# Patient Record
Sex: FEMALE | Race: OTHER | Employment: PART TIME | URBAN - METROPOLITAN AREA
[De-identification: names, ages, dates, MRNs, and addresses within clinical notes are randomized per-mention and may not be internally consistent; named-entity substitution may affect disease eponyms.]

---

## 2018-02-02 ENCOUNTER — HOSPITAL ENCOUNTER (EMERGENCY)
Age: 28
Discharge: HOME | End: 2018-02-03
Attending: EMERGENCY MEDICINE
Payer: SUBSIDIZED

## 2018-02-02 ENCOUNTER — APPOINTMENT (OUTPATIENT)
Dept: ULTRASOUND IMAGING | Age: 28
End: 2018-02-02
Attending: PHYSICIAN ASSISTANT
Payer: SUBSIDIZED

## 2018-02-02 DIAGNOSIS — K80.50 BILIARY COLIC: Primary | ICD-10-CM

## 2018-02-02 DIAGNOSIS — K76.0 HEPATIC STEATOSIS: ICD-10-CM

## 2018-02-02 LAB
ALBUMIN SERPL-MCNC: 4.2 G/DL (ref 3.5–5)
ALBUMIN/GLOB SERPL: 1.1 {RATIO} (ref 1.1–2.2)
ALP SERPL-CCNC: 107 U/L (ref 45–117)
ALT SERPL-CCNC: 205 U/L (ref 12–78)
ANION GAP SERPL CALC-SCNC: 7 MMOL/L (ref 5–15)
APPEARANCE UR: ABNORMAL
AST SERPL-CCNC: 266 U/L (ref 15–37)
BACTERIA URNS QL MICRO: NEGATIVE /HPF
BASOPHILS # BLD: 0 K/UL (ref 0–0.1)
BASOPHILS NFR BLD: 0 % (ref 0–1)
BILIRUB SERPL-MCNC: 0.3 MG/DL (ref 0.2–1)
BILIRUB UR QL: NEGATIVE
BUN SERPL-MCNC: 12 MG/DL (ref 6–20)
BUN/CREAT SERPL: 21 (ref 12–20)
CALCIUM SERPL-MCNC: 8.8 MG/DL (ref 8.5–10.1)
CHLORIDE SERPL-SCNC: 105 MMOL/L (ref 97–108)
CO2 SERPL-SCNC: 26 MMOL/L (ref 21–32)
COLOR UR: ABNORMAL
CREAT SERPL-MCNC: 0.56 MG/DL (ref 0.55–1.02)
DIFFERENTIAL METHOD BLD: ABNORMAL
EOSINOPHIL # BLD: 0 K/UL (ref 0–0.4)
EOSINOPHIL NFR BLD: 0 % (ref 0–7)
EPITH CASTS URNS QL MICRO: ABNORMAL /LPF
ERYTHROCYTE [DISTWIDTH] IN BLOOD BY AUTOMATED COUNT: 13.8 % (ref 11.5–14.5)
GLOBULIN SER CALC-MCNC: 4 G/DL (ref 2–4)
GLUCOSE SERPL-MCNC: 103 MG/DL (ref 65–100)
GLUCOSE UR STRIP.AUTO-MCNC: NEGATIVE MG/DL
HCG UR QL: NEGATIVE
HCT VFR BLD AUTO: 40.9 % (ref 35–47)
HGB BLD-MCNC: 13.7 G/DL (ref 11.5–16)
HGB UR QL STRIP: NEGATIVE
HYALINE CASTS URNS QL MICRO: ABNORMAL /LPF (ref 0–5)
IMM GRANULOCYTES # BLD: 0 K/UL (ref 0–0.04)
IMM GRANULOCYTES NFR BLD AUTO: 0 % (ref 0–0.5)
KETONES UR QL STRIP.AUTO: NEGATIVE MG/DL
LEUKOCYTE ESTERASE UR QL STRIP.AUTO: NEGATIVE
LIPASE SERPL-CCNC: 164 U/L (ref 73–393)
LYMPHOCYTES # BLD: 2.4 K/UL (ref 0.8–3.5)
LYMPHOCYTES NFR BLD: 20 % (ref 12–49)
MCH RBC QN AUTO: 29.8 PG (ref 26–34)
MCHC RBC AUTO-ENTMCNC: 33.5 G/DL (ref 30–36.5)
MCV RBC AUTO: 88.9 FL (ref 80–99)
MONOCYTES # BLD: 0.6 K/UL (ref 0–1)
MONOCYTES NFR BLD: 5 % (ref 5–13)
NEUTS SEG # BLD: 8.9 K/UL (ref 1.8–8)
NEUTS SEG NFR BLD: 75 % (ref 32–75)
NITRITE UR QL STRIP.AUTO: NEGATIVE
NRBC # BLD: 0 K/UL (ref 0–0.01)
NRBC BLD-RTO: 0 PER 100 WBC
PH UR STRIP: 7.5 [PH] (ref 5–8)
PLATELET # BLD AUTO: 400 K/UL (ref 150–400)
PMV BLD AUTO: 10.2 FL (ref 8.9–12.9)
POTASSIUM SERPL-SCNC: 3.4 MMOL/L (ref 3.5–5.1)
PROT SERPL-MCNC: 8.2 G/DL (ref 6.4–8.2)
PROT UR STRIP-MCNC: NEGATIVE MG/DL
RBC # BLD AUTO: 4.6 M/UL (ref 3.8–5.2)
RBC #/AREA URNS HPF: ABNORMAL /HPF (ref 0–5)
SODIUM SERPL-SCNC: 138 MMOL/L (ref 136–145)
SP GR UR REFRACTOMETRY: 1.02 (ref 1–1.03)
UROBILINOGEN UR QL STRIP.AUTO: 0.2 EU/DL (ref 0.2–1)
WBC # BLD AUTO: 12 K/UL (ref 3.6–11)
WBC URNS QL MICRO: ABNORMAL /HPF (ref 0–4)

## 2018-02-02 PROCEDURE — 83690 ASSAY OF LIPASE: CPT | Performed by: PHYSICIAN ASSISTANT

## 2018-02-02 PROCEDURE — 81003 URINALYSIS AUTO W/O SCOPE: CPT | Performed by: PHYSICIAN ASSISTANT

## 2018-02-02 PROCEDURE — 99283 EMERGENCY DEPT VISIT LOW MDM: CPT

## 2018-02-02 PROCEDURE — 85025 COMPLETE CBC W/AUTO DIFF WBC: CPT | Performed by: STUDENT IN AN ORGANIZED HEALTH CARE EDUCATION/TRAINING PROGRAM

## 2018-02-02 PROCEDURE — 76705 ECHO EXAM OF ABDOMEN: CPT

## 2018-02-02 PROCEDURE — 81025 URINE PREGNANCY TEST: CPT

## 2018-02-02 PROCEDURE — 36415 COLL VENOUS BLD VENIPUNCTURE: CPT | Performed by: STUDENT IN AN ORGANIZED HEALTH CARE EDUCATION/TRAINING PROGRAM

## 2018-02-02 PROCEDURE — 80053 COMPREHEN METABOLIC PANEL: CPT | Performed by: STUDENT IN AN ORGANIZED HEALTH CARE EDUCATION/TRAINING PROGRAM

## 2018-02-03 VITALS
WEIGHT: 158 LBS | OXYGEN SATURATION: 98 % | SYSTOLIC BLOOD PRESSURE: 109 MMHG | TEMPERATURE: 99 F | HEART RATE: 71 BPM | RESPIRATION RATE: 16 BRPM | HEIGHT: 59 IN | BODY MASS INDEX: 31.85 KG/M2 | DIASTOLIC BLOOD PRESSURE: 69 MMHG

## 2018-02-03 NOTE — ED PROVIDER NOTES
HPI Comments: 34 yo  female with known hx of gallstones presenting ambulatory to the Ed with complaint of a several year hx of post-prandial RUQ abdominal pain, acutely worse today about 1800 after eating a [place] sandwich, with associated nausea without vomiting. Pain improved since arriving to the ED. No associated fever, chills, headache, chest pain, SOB, vomiting, diarrhea, constipation or urinary complaint. No medications PTA. Patient is a 33 y.o. female presenting with abdominal pain. The history is provided by the patient. Abdominal Pain    Associated symptoms include nausea. Pertinent negatives include no fever, no diarrhea, no vomiting, no constipation, no frequency, no headaches, no arthralgias, no chest pain and no back pain. Past Medical History:   Diagnosis Date    Gall stone        History reviewed. No pertinent surgical history. History reviewed. No pertinent family history. Social History     Social History    Marital status: SINGLE     Spouse name: N/A    Number of children: N/A    Years of education: N/A     Occupational History    Not on file. Social History Main Topics    Smoking status: Never Smoker    Smokeless tobacco: Never Used    Alcohol use No    Drug use: Not on file    Sexual activity: Not on file     Other Topics Concern    Not on file     Social History Narrative    No narrative on file         ALLERGIES: Review of patient's allergies indicates no known allergies. Review of Systems   Constitutional: Negative. Negative for chills, fatigue and fever. HENT: Negative. Negative for congestion, ear pain, facial swelling, rhinorrhea, sneezing and sore throat. Eyes: Negative for pain, discharge and itching. Respiratory: Negative for cough, chest tightness and shortness of breath. Cardiovascular: Negative. Negative for chest pain and leg swelling. Gastrointestinal: Positive for abdominal pain (ruq) and nausea.  Negative for abdominal distention, constipation, diarrhea and vomiting. Genitourinary: Negative for difficulty urinating, frequency and urgency. Musculoskeletal: Negative for arthralgias, back pain, joint swelling, neck pain and neck stiffness. Skin: Negative for color change and rash. Neurological: Negative for dizziness, numbness and headaches. Psychiatric/Behavioral: Negative for confusion and decreased concentration. All other systems reviewed and are negative. Vitals:    02/02/18 2027   BP: 123/78   Pulse: 77   Resp: 16   Temp: 98.5 °F (36.9 °C)   SpO2: 98%   Weight: 71.7 kg (158 lb)   Height: 4' 11\" (1.499 m)            Physical Exam   Constitutional: She is oriented to person, place, and time. She appears well-developed and well-nourished. No distress. Well appearing  female in NAD   HENT:   Head: Normocephalic and atraumatic. Right Ear: External ear normal.   Left Ear: External ear normal.   Nose: Nose normal.   Mouth/Throat: Oropharynx is clear and moist. No oropharyngeal exudate. Eyes: Conjunctivae and EOM are normal. Pupils are equal, round, and reactive to light. Right eye exhibits no discharge. Left eye exhibits no discharge. No scleral icterus. Neck: Normal range of motion. Neck supple. Cardiovascular: Normal rate and regular rhythm. Exam reveals no gallop and no friction rub. No murmur heard. Pulmonary/Chest: Effort normal and breath sounds normal. She has no wheezes. She has no rales. Abdominal: Soft. Bowel sounds are normal. She exhibits no distension. There is tenderness (Ruq). There is no rebound and no guarding. Neurological: She is alert and oriented to person, place, and time. No cranial nerve deficit. Coordination normal.   Skin: Skin is warm and dry. She is not diaphoretic. Psychiatric: She has a normal mood and affect. Her behavior is normal.   Nursing note and vitals reviewed.        MDM  Number of Diagnoses or Management Options  Biliary colic:   Hepatic steatosis:   Diagnosis management comments: 34 yo  female with complaint acute on chronic prandially associated RUQ abdominal pain. Suspect biliary colic vs gastritis vs PUD vs pancreatitis amongst others    Plan  CBC  CMP  Lipase  US abd       Amount and/or Complexity of Data Reviewed  Clinical lab tests: ordered and reviewed  Tests in the radiology section of CPT®: ordered and reviewed  Independent visualization of images, tracings, or specimens: yes          ED Course       Procedures    Progress note    Gallstones noted with transaminitis. Normal Bili and lipase. Pain free without use of analgesia in ED.  nml CBD size. No indication of choledocholithiasis. Doubt acute cholecystitis only positive Sharma without associated pericholecystic fluid or gallbladder wall thickening. Hepatic steatosis noted. Demetrio Garcia    Patient's results have been reviewed with them. Patient and/or family have verbally conveyed their understanding and agreement of the patient's signs, symptoms, diagnosis, treatment and prognosis and additionally agree to follow up as recommended or return to the Emergency Room should their condition change prior to follow-up. Discharge instructions have also been provided to the patient with some educational information regarding their diagnosis as well a list of reasons why they would want to return to the ER prior to their follow-up appointment should their condition change. Demetrio Gacria    A/p  Biliary colic: Low fat diet. Follow-up with general surgery. Return for any new or worsening.  Demetrio Dailey

## 2018-02-03 NOTE — ED NOTES
Discharge instructions given to pt in Croatian. All questions answered and pt verbalized understanding. V/S stable @ time of discharge. No lines or drains in place. Pt ambulatory out of unit.

## 2018-02-03 NOTE — DISCHARGE INSTRUCTIONS
Dieta baja en grasa para la enfermedad de la vesícula biliar: Instrucciones de cuidado - [ Low-Fat Diet for Gallbladder Disease: Care Instructions ]  Instrucciones de cuidado    Cuando usted come, la vesícula biliar secreta bilis, que ayuda a digerir la [employer]. Si la vesícula biliar está inflamada podría causar dolor. Alberteen Mean baja en grasa podría darle a fung vesícula biliar un descanso para que usted pueda empezar a sanar. Fung médico y fung dietista pueden ayudarle a hacer un plan de alimentación que no le irrite el sistema digestivo. Siempre consulte a fung médico o dietista antes de hacer modificaciones en fung dieta. La atención de seguimiento es olivia parte clave de fung tratamiento y seguridad. Asegúrese de hacer y acudir a todas las citas, y llame a fung médico si está teniendo problemas. También es olivia buena idea saber los resultados de los exámenes y mantener olivia lista de los medicamentos que marcia. ¿Cómo puede cuidarse en el hogar? · Coma varias comidas pequeñas y refrigerios cada día, en lugar de tiffany comidas grandes. · Opte por charla magras. ¨ No coma más de 5 a 6½ onzas de carne al día. ¨ Descarte toda la grasa que pueda wes. ¨ Coma valarie y pavo sin piel. ¨ Muchos tipos de pescado, aroldo el salmón, la trucha de [place], el atún y el arenque proveen grasa omega-3 saludable. Lillie evite el pescado enlatado en aceite, aroldo las ramiro en aceite de [place]. [address] pescado al horno, a la ramiro o asados en lugar de freírlos con [place] o grasa. · [address] y coma yogur, queso u otros derivados lácteos sin grasa o bajos en grasa todos los [address] .  Ana las Quik.iorhina Martínez, y [employer] que tengan menos de 5 gramos de grasa por onza. ¨ Pruebe la crema agria, el queso para untar o el yogur descremados. ¨ Evite las sopas tipo crema y las salsas cremosas para la pasta. ¨ [place] helados, helados de yogur o sorbetes bajos en grasa. Evite el helado común. · Consuma cereales, pan, galletas, arroz o pasta de granos enteros. Evite los alimentos altos en grasa aroldo medialunas (\"croissants\"), bollos, bizcochos, gofres (\"waffles\"), rosquillas (\"donuts\"), magdalenas (\"muffins\"), granola y pan rico en grasa. · Condimente los alimentos con hierbas y especias (tales aroldo [place], estragón o menta), salsas sin grasa o jugo de bolivar en lugar de [place]. También puede usar sustitutos de la [place], [place] sin grasa y [facility] sin [place]. · Pruebe a hacer puré de [place], puré de ciruelas pasas o puré de bananas (plátanos) para reemplazar alguna o toda la grasa cuando polo comidas al horno. · Limite el uso de [place] y [place], tales aroldo [place], [facility], [place] y [facility] para [place], a no más de 1 cucharada por comida. · Evite los alimentos ricos en grasa, aroldo:  ¨ El chocolate, la [place], el helado y el queso procesado. ¨ Alimentos fritos o con [place]. ¨ Salchicha, salami y tocino. ¨ Rollos de christopher, pasteles, tartas, galletas y otro tipo de pastelería. ¨ Los refrigerios preparados, aroldo javan fritas, barritas de nueces y granola, así aroldo nueces mixtas. ¨ Gabby y aguacate (palta). · Aprenda a leer las etiquetas de los alimentos para conocer el tamaño de las porciones y los ingredientes. En general, las comidas rápidas y las comidas preparadas contienen Krista Colton. ¿Dónde puede encontrar más información en inglés? Aund[place] Dates a http://arlen-riri.info/. Marilyn Pacheco X504 en la búsqueda para aprender más acerca de \"Dieta baja en grasa para la enfermedad de la vesícula biliar: Instrucciones de cuidado - [ Low-Fat Diet for Gallbladder Disease: Care Instructions ]. \"  Revisado: 12 villela, 2017  Versión del contenido: 11.4  © 1529-2433 Healthwise, [employer]. Las instrucciones de cuidado fueron adaptadas bajo licencia por [employer] (which disclaims liability or warranty for this information).  Si usted tiene [place] afección médica o sobre estas instrucciones, siempre pregunte a fung profesional de alli. [facility] niega toda garantía o responsabilidad por fung uso de esta información.

## 2018-02-03 NOTE — ED TRIAGE NOTES
Pt is a Mauritian speaking pt, [email or web address] phone in use for triage. Pt states that she has had strong stomach pain for the past three hours, pt states that her upper right quadrant is where the pain is. Pt states she has nausea but no vomiting or diarrhea.

## 2018-02-03 NOTE — ED NOTES
Bedside shift change report given to Jose Torres RN (oncoming nurse) by Stacie Turcios RN (offgoing nurse). Report included the following information SBAR, ED Summary, Intake/Output, MAR and Recent Results.

## 2018-05-16 ENCOUNTER — HOSPITAL ENCOUNTER (OUTPATIENT)
Dept: ULTRASOUND IMAGING | Age: 28
Discharge: HOME | End: 2018-05-16
Attending: FAMILY MEDICINE
Payer: SUBSIDIZED

## 2018-05-16 DIAGNOSIS — R10.11 RUQ ABDOMINAL PAIN: ICD-10-CM

## 2018-05-16 PROCEDURE — 76801 OB US < 14 WKS SINGLE FETUS: CPT

## 2018-05-16 PROCEDURE — 76700 US EXAM ABDOM COMPLETE: CPT

## 2018-05-16 PROCEDURE — 76817 TRANSVAGINAL US OBSTETRIC: CPT

## 2019-05-28 ENCOUNTER — ANESTHESIA (OUTPATIENT)
Dept: SURGERY | Age: 29
End: 2019-05-28
Payer: SUBSIDIZED

## 2019-05-28 ENCOUNTER — HOSPITAL ENCOUNTER (OUTPATIENT)
Age: 29
Setting detail: OBSERVATION
Discharge: HOME | End: 2019-05-28
Attending: EMERGENCY MEDICINE | Admitting: SURGERY
Payer: SUBSIDIZED

## 2019-05-28 ENCOUNTER — APPOINTMENT (OUTPATIENT)
Dept: ULTRASOUND IMAGING | Age: 29
End: 2019-05-28
Attending: EMERGENCY MEDICINE
Payer: SUBSIDIZED

## 2019-05-28 ENCOUNTER — ANESTHESIA EVENT (OUTPATIENT)
Dept: SURGERY | Age: 29
End: 2019-05-28
Payer: SUBSIDIZED

## 2019-05-28 VITALS
HEIGHT: 59 IN | WEIGHT: 158 LBS | HEART RATE: 87 BPM | RESPIRATION RATE: 19 BRPM | BODY MASS INDEX: 31.85 KG/M2 | DIASTOLIC BLOOD PRESSURE: 66 MMHG | OXYGEN SATURATION: 97 % | SYSTOLIC BLOOD PRESSURE: 114 MMHG | TEMPERATURE: 97.6 F

## 2019-05-28 DIAGNOSIS — K80.70 CHOLELITHIASIS WITH CHOLEDOCHOLITHIASIS: Primary | ICD-10-CM

## 2019-05-28 PROBLEM — K81.9 CHOLECYSTITIS: Status: ACTIVE | Noted: 2019-05-28

## 2019-05-28 LAB
ALBUMIN SERPL-MCNC: 4 G/DL (ref 3.5–5)
ALBUMIN/GLOB SERPL: 1.1 {RATIO} (ref 1.1–2.2)
ALP SERPL-CCNC: 108 U/L (ref 45–117)
ALT SERPL-CCNC: 60 U/L (ref 12–78)
ANION GAP SERPL CALC-SCNC: 8 MMOL/L (ref 5–15)
AST SERPL-CCNC: 41 U/L (ref 15–37)
BASOPHILS # BLD: 0 K/UL (ref 0–0.1)
BASOPHILS NFR BLD: 1 % (ref 0–1)
BILIRUB SERPL-MCNC: 0.5 MG/DL (ref 0.2–1)
BUN SERPL-MCNC: 15 MG/DL (ref 6–20)
BUN/CREAT SERPL: 29 (ref 12–20)
CALCIUM SERPL-MCNC: 8.6 MG/DL (ref 8.5–10.1)
CHLORIDE SERPL-SCNC: 109 MMOL/L (ref 97–108)
CO2 SERPL-SCNC: 21 MMOL/L (ref 21–32)
CREAT SERPL-MCNC: 0.51 MG/DL (ref 0.55–1.02)
DIFFERENTIAL METHOD BLD: ABNORMAL
EOSINOPHIL # BLD: 0 K/UL (ref 0–0.4)
EOSINOPHIL NFR BLD: 1 % (ref 0–7)
ERYTHROCYTE [DISTWIDTH] IN BLOOD BY AUTOMATED COUNT: 13.4 % (ref 11.5–14.5)
GLOBULIN SER CALC-MCNC: 3.6 G/DL (ref 2–4)
GLUCOSE SERPL-MCNC: 108 MG/DL (ref 65–100)
HCG UR QL: NEGATIVE
HCT VFR BLD AUTO: 40.7 % (ref 35–47)
HGB BLD-MCNC: 13.6 G/DL (ref 11.5–16)
IMM GRANULOCYTES # BLD AUTO: 0 K/UL (ref 0–0.04)
IMM GRANULOCYTES NFR BLD AUTO: 0 % (ref 0–0.5)
LIPASE SERPL-CCNC: 101 U/L (ref 73–393)
LYMPHOCYTES # BLD: 1.4 K/UL (ref 0.8–3.5)
LYMPHOCYTES NFR BLD: 16 % (ref 12–49)
MCH RBC QN AUTO: 30.2 PG (ref 26–34)
MCHC RBC AUTO-ENTMCNC: 33.4 G/DL (ref 30–36.5)
MCV RBC AUTO: 90.4 FL (ref 80–99)
MONOCYTES # BLD: 0.5 K/UL (ref 0–1)
MONOCYTES NFR BLD: 6 % (ref 5–13)
NEUTS SEG # BLD: 6.5 K/UL (ref 1.8–8)
NEUTS SEG NFR BLD: 76 % (ref 32–75)
NRBC # BLD: 0 K/UL (ref 0–0.01)
NRBC BLD-RTO: 0 PER 100 WBC
PLATELET # BLD AUTO: 361 K/UL (ref 150–400)
PMV BLD AUTO: 9.7 FL (ref 8.9–12.9)
POTASSIUM SERPL-SCNC: 3.8 MMOL/L (ref 3.5–5.1)
PROT SERPL-MCNC: 7.6 G/DL (ref 6.4–8.2)
RBC # BLD AUTO: 4.5 M/UL (ref 3.8–5.2)
SODIUM SERPL-SCNC: 138 MMOL/L (ref 136–145)
WBC # BLD AUTO: 8.4 K/UL (ref 3.6–11)

## 2019-05-28 PROCEDURE — 74011250636 HC RX REV CODE- 250/636

## 2019-05-28 PROCEDURE — 74011250636 HC RX REV CODE- 250/636: Performed by: EMERGENCY MEDICINE

## 2019-05-28 PROCEDURE — 77030031139 HC SUT VCRL2 J&J -A: Performed by: SURGERY

## 2019-05-28 PROCEDURE — 77030020782 HC GWN BAIR PAWS FLX 3M -B

## 2019-05-28 PROCEDURE — 74011636320 HC RX REV CODE- 636/320: Performed by: SURGERY

## 2019-05-28 PROCEDURE — 80053 COMPREHEN METABOLIC PANEL: CPT

## 2019-05-28 PROCEDURE — 77030037032 HC INSRT SCIS CLICKLLINE DISP STOR -B: Performed by: SURGERY

## 2019-05-28 PROCEDURE — 76705 ECHO EXAM OF ABDOMEN: CPT

## 2019-05-28 PROCEDURE — 74011000250 HC RX REV CODE- 250

## 2019-05-28 PROCEDURE — 77030004813 HC CATH CHOLGM TELE -B: Performed by: SURGERY

## 2019-05-28 PROCEDURE — 85025 COMPLETE CBC W/AUTO DIFF WBC: CPT

## 2019-05-28 PROCEDURE — 99285 EMERGENCY DEPT VISIT HI MDM: CPT

## 2019-05-28 PROCEDURE — 77030011640 HC PAD GRND REM COVD -A: Performed by: SURGERY

## 2019-05-28 PROCEDURE — 96361 HYDRATE IV INFUSION ADD-ON: CPT

## 2019-05-28 PROCEDURE — 74011250636 HC RX REV CODE- 250/636: Performed by: SURGERY

## 2019-05-28 PROCEDURE — 74011000250 HC RX REV CODE- 250: Performed by: SURGERY

## 2019-05-28 PROCEDURE — 77030032490 HC SLV COMPR SCD KNE COVD -B

## 2019-05-28 PROCEDURE — 36415 COLL VENOUS BLD VENIPUNCTURE: CPT

## 2019-05-28 PROCEDURE — 76210000016 HC OR PH I REC 1 TO 1.5 HR: Performed by: SURGERY

## 2019-05-28 PROCEDURE — 77030025088 HC APPL CLP LIG 1 COVD -B: Performed by: SURGERY

## 2019-05-28 PROCEDURE — 77030020747 HC TU INSUF ENDOSC TELE -A: Performed by: SURGERY

## 2019-05-28 PROCEDURE — 99218 HC RM OBSERVATION: CPT

## 2019-05-28 PROCEDURE — 77030008756 HC TU IRR SUC STRY -B: Performed by: SURGERY

## 2019-05-28 PROCEDURE — 77030035051: Performed by: SURGERY

## 2019-05-28 PROCEDURE — 77030002933 HC SUT MCRYL J&J -A: Performed by: SURGERY

## 2019-05-28 PROCEDURE — 96360 HYDRATION IV INFUSION INIT: CPT

## 2019-05-28 PROCEDURE — 77030026438 HC STYL ET INTUB CARD -A: Performed by: ANESTHESIOLOGY

## 2019-05-28 PROCEDURE — 77030009852 HC PCH RTVR ENDOSC COVD -B: Performed by: SURGERY

## 2019-05-28 PROCEDURE — 77030035048 HC TRCR ENDOSC OPTCL COVD -B: Performed by: SURGERY

## 2019-05-28 PROCEDURE — C9290 INJ, BUPIVACAINE LIPOSOME: HCPCS | Performed by: SURGERY

## 2019-05-28 PROCEDURE — 83690 ASSAY OF LIPASE: CPT

## 2019-05-28 PROCEDURE — 77030008684 HC TU ET CUF COVD -B: Performed by: ANESTHESIOLOGY

## 2019-05-28 PROCEDURE — 74011250636 HC RX REV CODE- 250/636: Performed by: ANESTHESIOLOGY

## 2019-05-28 PROCEDURE — 76010000149 HC OR TIME 1 TO 1.5 HR: Performed by: SURGERY

## 2019-05-28 PROCEDURE — 81025 URINE PREGNANCY TEST: CPT

## 2019-05-28 PROCEDURE — 77030035045 HC TRCR ENDOSC VRSPRT BLDLSS COVD -B: Performed by: SURGERY

## 2019-05-28 PROCEDURE — 88304 TISSUE EXAM BY PATHOLOGIST: CPT

## 2019-05-28 PROCEDURE — 76060000033 HC ANESTHESIA 1 TO 1.5 HR: Performed by: SURGERY

## 2019-05-28 RX ORDER — KETOROLAC TROMETHAMINE 30 MG/ML
INJECTION, SOLUTION INTRAMUSCULAR; INTRAVENOUS AS NEEDED
Status: DISCONTINUED | OUTPATIENT
Start: 2019-05-28 | End: 2019-05-28 | Stop reason: HOSPADM

## 2019-05-28 RX ORDER — HYDROMORPHONE HYDROCHLORIDE 2 MG/ML
1 INJECTION, SOLUTION INTRAMUSCULAR; INTRAVENOUS; SUBCUTANEOUS
Status: DISCONTINUED | OUTPATIENT
Start: 2019-05-28 | End: 2019-05-28

## 2019-05-28 RX ORDER — DIPHENHYDRAMINE HYDROCHLORIDE 50 MG/ML
12.5 INJECTION, SOLUTION INTRAMUSCULAR; INTRAVENOUS AS NEEDED
Status: DISCONTINUED | OUTPATIENT
Start: 2019-05-28 | End: 2019-05-28 | Stop reason: HOSPADM

## 2019-05-28 RX ORDER — SUCCINYLCHOLINE CHLORIDE 20 MG/ML
INJECTION INTRAMUSCULAR; INTRAVENOUS AS NEEDED
Status: DISCONTINUED | OUTPATIENT
Start: 2019-05-28 | End: 2019-05-28 | Stop reason: HOSPADM

## 2019-05-28 RX ORDER — DEXAMETHASONE SODIUM PHOSPHATE 4 MG/ML
INJECTION, SOLUTION INTRA-ARTICULAR; INTRALESIONAL; INTRAMUSCULAR; INTRAVENOUS; SOFT TISSUE AS NEEDED
Status: DISCONTINUED | OUTPATIENT
Start: 2019-05-28 | End: 2019-05-28 | Stop reason: HOSPADM

## 2019-05-28 RX ORDER — DIPHENHYDRAMINE HCL 25 MG
25 CAPSULE ORAL
Status: DISCONTINUED | OUTPATIENT
Start: 2019-05-28 | End: 2019-05-28 | Stop reason: HOSPADM

## 2019-05-28 RX ORDER — MIDAZOLAM HYDROCHLORIDE 1 MG/ML
2 INJECTION, SOLUTION INTRAMUSCULAR; INTRAVENOUS
Status: DISCONTINUED | OUTPATIENT
Start: 2019-05-28 | End: 2019-05-28 | Stop reason: HOSPADM

## 2019-05-28 RX ORDER — POLYETHYLENE GLYCOL 3350 17 G/17G
17 POWDER, FOR SOLUTION ORAL DAILY
Qty: 238 G | Refills: 0 | Status: SHIPPED | OUTPATIENT
Start: 2019-05-28

## 2019-05-28 RX ORDER — FENTANYL CITRATE 50 UG/ML
INJECTION, SOLUTION INTRAMUSCULAR; INTRAVENOUS AS NEEDED
Status: DISCONTINUED | OUTPATIENT
Start: 2019-05-28 | End: 2019-05-28 | Stop reason: HOSPADM

## 2019-05-28 RX ORDER — SODIUM CHLORIDE, SODIUM LACTATE, POTASSIUM CHLORIDE, CALCIUM CHLORIDE 600; 310; 30; 20 MG/100ML; MG/100ML; MG/100ML; MG/100ML
75 INJECTION, SOLUTION INTRAVENOUS CONTINUOUS
Status: DISCONTINUED | OUTPATIENT
Start: 2019-05-28 | End: 2019-05-28 | Stop reason: HOSPADM

## 2019-05-28 RX ORDER — SODIUM CHLORIDE, SODIUM LACTATE, POTASSIUM CHLORIDE, CALCIUM CHLORIDE 600; 310; 30; 20 MG/100ML; MG/100ML; MG/100ML; MG/100ML
125 INJECTION, SOLUTION INTRAVENOUS CONTINUOUS
Status: DISCONTINUED | OUTPATIENT
Start: 2019-05-28 | End: 2019-05-28 | Stop reason: HOSPADM

## 2019-05-28 RX ORDER — ONDANSETRON 4 MG/1
4 TABLET, ORALLY DISINTEGRATING ORAL
Qty: 20 TAB | Refills: 1 | Status: SHIPPED | OUTPATIENT
Start: 2019-05-28

## 2019-05-28 RX ORDER — NALOXONE HYDROCHLORIDE 0.4 MG/ML
0.2 INJECTION, SOLUTION INTRAMUSCULAR; INTRAVENOUS; SUBCUTANEOUS
Status: DISCONTINUED | OUTPATIENT
Start: 2019-05-28 | End: 2019-05-28 | Stop reason: HOSPADM

## 2019-05-28 RX ORDER — ONDANSETRON 2 MG/ML
4 INJECTION INTRAMUSCULAR; INTRAVENOUS
Status: DISCONTINUED | OUTPATIENT
Start: 2019-05-28 | End: 2019-05-28 | Stop reason: HOSPADM

## 2019-05-28 RX ORDER — NEOSTIGMINE METHYLSULFATE 1 MG/ML
INJECTION INTRAVENOUS AS NEEDED
Status: DISCONTINUED | OUTPATIENT
Start: 2019-05-28 | End: 2019-05-28 | Stop reason: HOSPADM

## 2019-05-28 RX ORDER — LIDOCAINE HYDROCHLORIDE 20 MG/ML
INJECTION, SOLUTION EPIDURAL; INFILTRATION; INTRACAUDAL; PERINEURAL AS NEEDED
Status: DISCONTINUED | OUTPATIENT
Start: 2019-05-28 | End: 2019-05-28 | Stop reason: HOSPADM

## 2019-05-28 RX ORDER — OXYCODONE AND ACETAMINOPHEN 5; 325 MG/1; MG/1
2 TABLET ORAL
Status: DISCONTINUED | OUTPATIENT
Start: 2019-05-28 | End: 2019-05-28 | Stop reason: HOSPADM

## 2019-05-28 RX ORDER — ROCURONIUM BROMIDE 10 MG/ML
INJECTION, SOLUTION INTRAVENOUS AS NEEDED
Status: DISCONTINUED | OUTPATIENT
Start: 2019-05-28 | End: 2019-05-28 | Stop reason: HOSPADM

## 2019-05-28 RX ORDER — HYDROMORPHONE HYDROCHLORIDE 1 MG/ML
.25-1 INJECTION, SOLUTION INTRAMUSCULAR; INTRAVENOUS; SUBCUTANEOUS
Status: DISCONTINUED | OUTPATIENT
Start: 2019-05-28 | End: 2019-05-28 | Stop reason: HOSPADM

## 2019-05-28 RX ORDER — ONDANSETRON 2 MG/ML
4 INJECTION INTRAMUSCULAR; INTRAVENOUS
Status: DISCONTINUED | OUTPATIENT
Start: 2019-05-28 | End: 2019-05-28

## 2019-05-28 RX ORDER — KETOROLAC TROMETHAMINE 30 MG/ML
30 INJECTION, SOLUTION INTRAMUSCULAR; INTRAVENOUS EVERY 6 HOURS
Status: DISCONTINUED | OUTPATIENT
Start: 2019-05-28 | End: 2019-05-28 | Stop reason: HOSPADM

## 2019-05-28 RX ORDER — CEFAZOLIN SODIUM/WATER 2 G/20 ML
2 SYRINGE (ML) INTRAVENOUS ONCE
Status: COMPLETED | OUTPATIENT
Start: 2019-05-28 | End: 2019-05-28

## 2019-05-28 RX ORDER — OXYCODONE AND ACETAMINOPHEN 5; 325 MG/1; MG/1
1 TABLET ORAL
Qty: 30 TAB | Refills: 0 | Status: SHIPPED | OUTPATIENT
Start: 2019-05-28 | End: 2019-06-04

## 2019-05-28 RX ORDER — FLUMAZENIL 0.1 MG/ML
0.2 INJECTION INTRAVENOUS
Status: DISCONTINUED | OUTPATIENT
Start: 2019-05-28 | End: 2019-05-28 | Stop reason: HOSPADM

## 2019-05-28 RX ORDER — LIDOCAINE HYDROCHLORIDE 10 MG/ML
0.1 INJECTION, SOLUTION EPIDURAL; INFILTRATION; INTRACAUDAL; PERINEURAL AS NEEDED
Status: DISCONTINUED | OUTPATIENT
Start: 2019-05-28 | End: 2019-05-28 | Stop reason: HOSPADM

## 2019-05-28 RX ORDER — OXYCODONE AND ACETAMINOPHEN 5; 325 MG/1; MG/1
1 TABLET ORAL
Status: DISCONTINUED | OUTPATIENT
Start: 2019-05-28 | End: 2019-05-28 | Stop reason: HOSPADM

## 2019-05-28 RX ORDER — ONDANSETRON 2 MG/ML
INJECTION INTRAMUSCULAR; INTRAVENOUS AS NEEDED
Status: DISCONTINUED | OUTPATIENT
Start: 2019-05-28 | End: 2019-05-28 | Stop reason: HOSPADM

## 2019-05-28 RX ORDER — GLYCOPYRROLATE 0.2 MG/ML
INJECTION INTRAMUSCULAR; INTRAVENOUS AS NEEDED
Status: DISCONTINUED | OUTPATIENT
Start: 2019-05-28 | End: 2019-05-28 | Stop reason: HOSPADM

## 2019-05-28 RX ORDER — PROPOFOL 10 MG/ML
INJECTION, EMULSION INTRAVENOUS AS NEEDED
Status: DISCONTINUED | OUTPATIENT
Start: 2019-05-28 | End: 2019-05-28 | Stop reason: HOSPADM

## 2019-05-28 RX ORDER — MIDAZOLAM HYDROCHLORIDE 1 MG/ML
INJECTION, SOLUTION INTRAMUSCULAR; INTRAVENOUS AS NEEDED
Status: DISCONTINUED | OUTPATIENT
Start: 2019-05-28 | End: 2019-05-28 | Stop reason: HOSPADM

## 2019-05-28 RX ADMIN — HYDROMORPHONE HYDROCHLORIDE 0.25 MG: 1 INJECTION, SOLUTION INTRAMUSCULAR; INTRAVENOUS; SUBCUTANEOUS at 14:34

## 2019-05-28 RX ADMIN — ONDANSETRON 4 MG: 2 INJECTION INTRAMUSCULAR; INTRAVENOUS at 07:46

## 2019-05-28 RX ADMIN — KETOROLAC TROMETHAMINE 30 MG: 30 INJECTION, SOLUTION INTRAMUSCULAR at 17:20

## 2019-05-28 RX ADMIN — FENTANYL CITRATE 50 MCG: 50 INJECTION, SOLUTION INTRAMUSCULAR; INTRAVENOUS at 13:12

## 2019-05-28 RX ADMIN — KETOROLAC TROMETHAMINE 30 MG: 30 INJECTION, SOLUTION INTRAMUSCULAR; INTRAVENOUS at 13:01

## 2019-05-28 RX ADMIN — ROCURONIUM BROMIDE 5 MG: 10 INJECTION, SOLUTION INTRAVENOUS at 12:16

## 2019-05-28 RX ADMIN — MIDAZOLAM HYDROCHLORIDE 2 MG: 1 INJECTION, SOLUTION INTRAMUSCULAR; INTRAVENOUS at 12:10

## 2019-05-28 RX ADMIN — SODIUM CHLORIDE, SODIUM LACTATE, POTASSIUM CHLORIDE, AND CALCIUM CHLORIDE 75 ML/HR: 600; 310; 30; 20 INJECTION, SOLUTION INTRAVENOUS at 14:10

## 2019-05-28 RX ADMIN — HYDROMORPHONE HYDROCHLORIDE 1 MG: 2 INJECTION INTRAMUSCULAR; INTRAVENOUS; SUBCUTANEOUS at 07:46

## 2019-05-28 RX ADMIN — NEOSTIGMINE METHYLSULFATE 3 MG: 1 INJECTION INTRAVENOUS at 13:01

## 2019-05-28 RX ADMIN — DEXAMETHASONE SODIUM PHOSPHATE 8 MG: 4 INJECTION, SOLUTION INTRA-ARTICULAR; INTRALESIONAL; INTRAMUSCULAR; INTRAVENOUS; SOFT TISSUE at 12:52

## 2019-05-28 RX ADMIN — FENTANYL CITRATE 50 MCG: 50 INJECTION, SOLUTION INTRAMUSCULAR; INTRAVENOUS at 12:10

## 2019-05-28 RX ADMIN — LIDOCAINE HYDROCHLORIDE 60 MG: 20 INJECTION, SOLUTION EPIDURAL; INFILTRATION; INTRACAUDAL; PERINEURAL at 12:16

## 2019-05-28 RX ADMIN — GLYCOPYRROLATE 0.5 MG: 0.2 INJECTION INTRAMUSCULAR; INTRAVENOUS at 13:01

## 2019-05-28 RX ADMIN — Medication 2 G: at 12:23

## 2019-05-28 RX ADMIN — ROCURONIUM BROMIDE 30 MG: 10 INJECTION, SOLUTION INTRAVENOUS at 12:26

## 2019-05-28 RX ADMIN — ONDANSETRON 4 MG: 2 INJECTION INTRAMUSCULAR; INTRAVENOUS at 12:52

## 2019-05-28 RX ADMIN — SUCCINYLCHOLINE CHLORIDE 100 MG: 20 INJECTION INTRAMUSCULAR; INTRAVENOUS at 12:17

## 2019-05-28 RX ADMIN — SODIUM CHLORIDE, POTASSIUM CHLORIDE, SODIUM LACTATE AND CALCIUM CHLORIDE: 600; 310; 30; 20 INJECTION, SOLUTION INTRAVENOUS at 11:43

## 2019-05-28 RX ADMIN — SODIUM CHLORIDE 1000 ML: 900 INJECTION, SOLUTION INTRAVENOUS at 07:40

## 2019-05-28 RX ADMIN — PROPOFOL 150 MG: 10 INJECTION, EMULSION INTRAVENOUS at 12:16

## 2019-05-28 RX ADMIN — SODIUM CHLORIDE, SODIUM LACTATE, POTASSIUM CHLORIDE, AND CALCIUM CHLORIDE 125 ML/HR: 600; 310; 30; 20 INJECTION, SOLUTION INTRAVENOUS at 12:07

## 2019-05-28 NOTE — ED NOTES
Per Brooke Winters in Pre-op, surgery is scheduled for noon. TRANSFER - OUT REPORT:    Verbal report given to Brooke Winters (name) on Jeevan Rank  being transferred to Pre-op (unit) for ordered procedure       Report consisted of patients Situation, Background, Assessment and   Recommendations(SBAR). Information from the following report(s) SBAR, ED Summary, Procedure Summary, MAR and Recent Results was reviewed with the receiving nurse. Lines:   Peripheral IV 05/28/19 Left; Outer Antecubital (Active)   Site Assessment Clean, dry, & intact 5/28/2019  7:43 AM   Phlebitis Assessment 0 5/28/2019  7:43 AM   Infiltration Assessment 0 5/28/2019  7:43 AM   Dressing Status Clean, dry, & intact; New 5/28/2019  7:43 AM   Dressing Type Transparent 5/28/2019  7:43 AM   Hub Color/Line Status Pink;Flushed;Patent 5/28/2019  7:43 AM   Action Taken Blood drawn 5/28/2019  7:43 AM        Opportunity for questions and clarification was provided. Patient to be transported with:   Registered Nurse and/or OR Tech in approximately 15 minutes.

## 2019-05-28 NOTE — ANESTHESIA POSTPROCEDURE EVALUATION
Procedure(s):  CHOLECYSTECTOMY LAPAROSCOPIC WITH GRAMS. general    Anesthesia Post Evaluation      Multimodal analgesia: multimodal analgesia not used between 6 hours prior to anesthesia start to PACU discharge  Patient location during evaluation: PACU  Patient participation: complete - patient participated  Level of consciousness: awake  Pain management: adequate  Airway patency: patent  Anesthetic complications: no  Cardiovascular status: acceptable, blood pressure returned to baseline and hemodynamically stable  Respiratory status: acceptable  Hydration status: acceptable  Post anesthesia nausea and vomiting:  controlled      Vitals Value Taken Time   /72 5/28/2019  2:15 PM   Temp 36.6 °C (97.8 °F) 5/28/2019  1:28 PM   Pulse 78 5/28/2019  2:20 PM   Resp 15 5/28/2019  2:20 PM   SpO2 95 % 5/28/2019  2:20 PM   Vitals shown include unvalidated device data.

## 2019-05-28 NOTE — DISCHARGE INSTRUCTIONS
Colecistectomía: Devika Carvajalsteven en el hogar - [ Cholecystectomy: What to Expect at [facility] ]  Fung recuperación  Después de la cirugía, es normal sentirse débil y fatigado por varios días después de regresar al hogar. Es posible que tenga el abdomen hinchado. Si le tony hecho olivia cirugía laparoscópica, también podría sentir dolor en el hombro ovidio unas 24 horas. Es posible que tenga gases o necesite eructar mucho al principio, y a [address]. La diarrea suele desaparecer en el transcurso de 2 a 4 semanas, morris podría durar más. El tiempo que tarde en recuperarse depende de si le tony hecho olivia cirugía laparoscópica o abierta. · En el cesar de [facility] laparoscópica, la mayoría de las personas pueden volver a trabajar o hacer fung rutina habitual en 1 a 2 semanas, morris podría tardar más, según el tipo de trabajo que polo. · En el cesar de [place], es probable que tarde de 4 a 6 semanas en poder volver a fung rutina habitual.  Esta hoja de [employer] idea general del tiempo que le llevará recuperarse. No obstante, cada persona se recupera a un ritmo diferente. Siga los pasos que se mencionan a continuación para recuperarse lo más rápido posible. ¿Cómo puede cuidarse en el hogar? Actividad    · Descanse cuando se sienta fatigado. Dormir lo suficiente le ayudará a recuperarse.     · Intente caminar todos los días. Comience caminando un poco más de lo que caminó el día anterior. Aumente en forma gradual la distancia que camina. Caminar aumenta el flujo de [place] y [place] a prevenir la neumonía y el estreñimiento.     · Evite levantar cualquier cosa que implique un esfuerzo ovidio unas 2 a 4 semanas.  Froid podría incluir un vianey, bolsas de las compras y envases de [place] pesados, mochilas o maletines pesados, bolsas de arena para excremento de truman o alimentos para perros, o olivia aspiradora.     · Evite actividades vigorosas, aroldo [employer], trotar, levantar pesas y [de-identified] ejercicio aeróbico, hasta que fung médico lo apruebe.     · Puede ducharse entre 24 y 50 horas después de la [place], si fung médico lo [place]. Seque el xochilt (incisión) con toques suaves de toalla. No tome sharmin de inmersión ovidio las primeras 2 semanas o hasta que fung médico lo apruebe.     · Puede conducir cuando ya no esté tomando analgésicos (medicamentos para el dolor) y pueda desplazar con rapidez fung pie del acelerador al freno. También debe estar en condiciones de poder permanecer sentado con comodidad ovidio un tiempo teresa, aun si no piensa ir muy lejos. Podría quedar atascado en el tráfico.     · Para la cirugía laparoscópica, la mayoría de las personas pueden volver a trabajar o hacer fung rutina normal en 1 a 2 semanas, aunque podrían [employer]. En el cesar de [place], es probable que tarde de 4 a 6 semanas en poder volver a fung rutina habitual.     · Fung médico le indicará cuándo puede volver a tener relaciones Dalbert Sovereign    · Coma cantidades más pequeñas varias veces al día en lugar de pocas veces y en grandes cantidades. Puede seguir olivia dieta normal, morris evite los alimentos grasosos por 1 mes aproximadamente. Entre los alimentos grasosos se [employer], la [place], [place] queso y muchos aperitivos. Si tiene [employer], coma alimentos suaves bajos en grasa, aroldo arroz sin condimentar, valarie a la ramiro, pan esha y yogur.     · Pavithra abundantes líquidos (a menos que fung médico le indique lo contrario).     · Si tiene diarrea, evite los alimentos condimentados, los productos lácteos, los alimentos grasosos y el alcohol. También puede observar si la causa es algún alimento en particular y dejar de comerlo. Si la diarrea [employer] de 2 semanas, hable con fung médico.     · Podría notar que no evacua el intestino con regularidad lucy después de la [place]. [place] es común.  Trate de evitar el estreñimiento y de no hacer esfuerzos cuando evacua el intestino. Sería conveniente timothy un suplemento de [place]. Si no ha evacuado el intestino después de un par de días, pregúntele a fung médico si puede timothy un laxante suave. Medicamentos    · Fung médico le dirá si puede volver a timothy reymundo medicamentos y cuándo puede volver a hacerlo. También le dará indicaciones sobre cualquier medicamento nuevo que deba timothy usted.     · Si marcia medicamentos que previenen la formación de coágulos de [place], aroldo warfarina (Coumadin), clopidogrel (Plavix) o aspirina, asegúrese de hablar con fung médico. Él o vargas le dirá si debe volver a timothy estos medicamentos y en qué momento. Asegúrese de que entiende exactamente lo que el médico quiere que polo.     · [place] los analgésicos exactamente aroldo le fueron indicados. ? Si el médico le recetó analgésicos, tómelos según las indicaciones. ? Si no está tomando analgésicos recetados, tome melecio de venta izzy, prakash aroldo acetaminofén (Tylenol), ibuprofeno (Advil, Motrin) o naproxeno (Aleve). Ana y siga todas las instrucciones de la [place]. ? No tome dos o más analgésicos al [employer], a menos que el médico se lo haya indicado. Muchos analgésicos contienen acetaminofén, es decir, Tylenol. El exceso de Tylenol puede ser dañino.     · Si le parece que el analgésico le está produciendo revoltura del estómago:  ? [place] el medicamento después de las comidas (a menos que fung médico le indique lo contrario). ? [place] al médico un analgésico diferente.     · Si fung médico le recetó antibióticos, tómelos según las indicaciones. No deje de tomarlos por el hecho de sentirse mejor. Debe timothy todos los antibióticos hasta terminarlos.    Cuidado de la incisión    · Si le tony puesto tiras de cinta [employer] incisión, o xochilt, déjeselas puestas ovidio olivia semana o hasta que se caigan por sí solas.     · Después de 24 a 48 horas, lave la vita a diario con [place] tibia y séquela con toques suaves de toalla.     · Es posible que tenga grapas para mantener el xochilt cerrado. Manténgalas secas hasta que fung [address] [facility] [address]. [place] es por lo general en 7 a 10 días.     · Mantenga la vita limpia y seca. Puede cubrirla con olivia venda de gasa si supura o roza contra la ropa. Cambie la venda todos los doretha.   Nya Angela    · Para reducir la hinchazón y el dolor, colóquese hielo o olivia compresa fría sobre el abdomen ovidio 10 a 20 minutos cada vez. Liz esto cada 1 a 2 horas. Póngase un paño salcedo entre el hielo y la piel. La atención de seguimiento es olivia parte clave de fung tratamiento y seguridad. Asegúrese de hacer y acudir a todas las citas, y llame a fung médico si está teniendo problemas. También es olivia buena idea saber los resultados de reymundo exámenes y mantener olivia lista de los medicamentos que marcia. ¿Cuándo debe pedir ayuda? Llame al 911 en cualquier momento que considere que necesita atención de [place]. Por ejemplo, llame si:    · Se desmayó (perdió el conocimiento).     · Tiene serias dificultades para respirar.     · Tiene dolor repentino en el pecho y falta de [place], o tose terese.    Llame a fung médico ahora mismo o busque atención médica inmediata si:    · Siente el estómago revuelto y no puede beber líquidos.     · Tiene dolor que no mejora después de timothy analgésicos.     · Tiene señales de infección, tales aroldo:  ? Aumento del dolor, la hinchazón, el enrojecimiento o la temperatura. ? Vetas rojizas que salen de la incisión. ? Pus que supura de la incisión. ? Ganglios linfáticos inflamados en el michelle, las axilas o la jakob. ? Alyssa Edinger.     · La orina es de color amarronado oscuro o las heces son de color gilmar o del color de la arcilla.     · La piel o la parte leo de los ojos se le ponen amarillentas.     · La venda colocada sobre la incisión se empapa con terese de color quigley vivo.     · Tiene señales de un coágulo de terese, tales aroldo:  ? Dolor en la pantorrilla, el muslo, la jakob o detrás de la rodilla. ?  Enrojecimiento e hinchazón en la pierna o la jakob.     · Tiene problemas para orinar o evacuar el intestino, en especial si tiene dolor leve o hinchazón en la parte baja del abdomen.    Preste especial atención a cualquier cambio en guadalupe alli y asegúrese de comunicarse con guadalupe médico si:    · Le tony hecho olivia Edger Gabe y el dolor en el hombro dura más de 24 horas.     · No evacua el intestino después de timothy un laxante. ¿Dónde puede encontrar más información en inglés? Moises Pena a http://arlen-riri.info/. Fili Travis F357 en la búsqueda para aprender más acerca de \"Colecistectomía: Supriya Wright en el [facility] - [ Cholecystectomy: What to Expect at [facility] ]. \"  Revisado: García 68, 2018  Versión del contenido: 11.9  © 6136-9252 Healthwise, Incorporated. Las instrucciones de cuidado fueron adaptadas bajo licencia por [employer] (which disclaims liability or warranty for this information). Si usted tiene [place] afección médica o sobre estas instrucciones, siempre pregunte a guadalupe profesional de alli. Healthwise, Incorporated niega toda garantía o responsabilidad por guadalupe uso de esta información. Patient Education        Elyssa Miner colecistectomía - [ Learning About Cholecystectomy ]  ¿Qué es colecistectomía? La colecistectomía es [place] para extirpar la vesícula biliar y los cálculos biliares. La vesícula biliar almacena la bilis que produce guadalupe hígado. La bilis le ayuda a digerir las grasas. Los cálculos biliares están hechos de colesterol y otras cosas que se encuentran en la bilis. Guadalupe cuerpo funcionará christian sin la vesícula biliar. La bilis irá directamente desde el hígado hasta el intestino. Puede edu pequeños cambios en guadalupe forma de digerir los alimentos, morris probablemente no los notará. ¿Cómo se hace la cirugía? La colecistectomía suele ser Edger Gabe.  Para hacer jose tipo de cirugía, un médico coloca un tubo iluminado, o laparoscopio, y otros instrumentos quirúrgicos a través de pequeños isaac (incisiones) en el abdomen. El médico puede observar reymundo órganos con el laparoscopio. Love Mariusz que se extrae la vesícula biliar, ya no tendrá más cálculos biliares. Los isaac raymond cicatrices que suelen desvanecerse con el tiempo. Se puede hacer cirugía abierta si se encuentran problemas ovidio la cirugía laparoscópica. Con cirugía abierta, se extrae la vesícula biliar a través de un xochilt más erlinda en el abdomen. Y la estadía hospitalaria es [employer]. ¿Qué puede esperar después de la cirugía? Es normal sentirse débil y cansado por varios días después de regresar a casa. Fung abdomen puede estar hinchado. Si tuvo cirugía laparoscópica, también puede tener dolor en el hombro ovidio unas 24 horas. Es posible que tenga gases o necesite eructar mucho al principio. A algunas personas les da diarrea. La diarrea suele desaparecer en 2 a 4 semanas. Lillie puede durar [employer]. Cómo de rápido se recupera usted depende del tipo de cirugía que le hicieron. Con cirugía laparoscópica, la mayoría de la gente puede volver al [place] o fung rutina normal en 1 a 2 semanas. Depende del tipo de trabajo que usted hace y de cómo se sienta. Si usted tiene [place], probablemente le tomará de 4 a 6 semanas antes de volver a fung rutina normal.  La atención de seguimiento es loivia parte clave de fung tratamiento y seguridad. Asegúrese de hacer y acudir a todas las citas, y llame a fung médico si está teniendo problemas. También es olivia buena idea saber los resultados de reymundo exámenes y mantener olivia lista de los medicamentos que marcia. ¿Dónde puede encontrar más información en inglés? Laure Glee a http://arlen-riri.info/. Ming Petit G003 en la búsqueda para aprender más acerca de \"Aprenda sobre colecistectomía - [ Learning About Cholecystectomy ]. \"  Revisado: García 67, 2018  Versión del contenido: 11.9  © 4502-1343 [employer].  Las instrucciones de cuidado fueron adaptadas bajo licencia por [employer] (which disclaims liability or warranty for this information). Si usted tiene [place] afección médica o sobre estas instrucciones, siempre pregunte a fung profesional de alli. [facility] niega toda garantía o responsabilidad por fung uso de esta información.

## 2019-05-28 NOTE — PROGRESS NOTES
05/28/19 1600   Dual Skin Pressure Injury Assessment   Dual Skin Pressure Injury Assessment WDL   Second Care Provider (Based on [address] [place] [address]) Kareen Merino RN   Primary Nurse Jermaine Long and Kareen Merino, RN performed a dual skin assessment on this patient No impairment noted  Arturo score is 22

## 2019-05-28 NOTE — PERIOP NOTES
TRANSFER - OUT REPORT:    Verbal report given to Terrence Baker RN on [address]  being transferred to [facility] for routine post - op       Report consisted of patients Situation, Background, Assessment and   Recommendations(SBAR). Information from the following report(s) SBAR, Kardex, OR Summary, Procedure Summary, MAR and Cardiac Rhythm NSR was reviewed with the receiving nurse. Lines:   Peripheral IV 05/28/19 Left; Outer Antecubital (Active)   Site Assessment Clean, dry, & intact 5/28/2019  2:25 PM   Phlebitis Assessment 0 5/28/2019  2:25 PM   Infiltration Assessment 0 5/28/2019  2:25 PM   Dressing Status Clean, dry, & intact 5/28/2019  2:25 PM   Dressing Type Tape;Transparent 5/28/2019  2:25 PM   Hub Color/Line Status Pink; Infusing;Patent 5/28/2019  2:25 PM   Action Taken Open ports on tubing capped 5/28/2019  2:25 PM   Alcohol Cap Used Yes 5/28/2019  2:25 PM        Opportunity for questions and clarification was provided. Patient transported with:   Registered Nurse    Pt reports she is breast feeding but does not need a pump while on the floor. Floor RN aware.

## 2019-05-28 NOTE — ANESTHESIA PREPROCEDURE EVALUATION
Relevant Problems   No relevant active problems       Anesthetic History   No history of anesthetic complications            Review of Systems / Medical History  Patient summary reviewed, nursing notes reviewed and pertinent labs reviewed    Pulmonary  Within defined limits                 Neuro/Psych   Within defined limits           Cardiovascular  Within defined limits                Exercise tolerance: >4 METS     GI/Hepatic/Renal  Within defined limits              Endo/Other        Obesity     Other Findings              Physical Exam    Airway  Mallampati: II    Neck ROM: normal range of motion   Mouth opening: Normal     Cardiovascular  Regular rate and rhythm,  S1 and S2 normal,  no murmur, click, rub, or gallop  Rhythm: regular  Rate: normal         Dental  No notable dental hx       Pulmonary  Breath sounds clear to auscultation               Abdominal  GI exam deferred       Other Findings            Anesthetic Plan    ASA: 2  Anesthesia type: general          Induction: Intravenous  Anesthetic plan and risks discussed with: Patient

## 2019-05-28 NOTE — PERIOP NOTES
Pt's spouse updated on pt's care with plan for admission with understanding being verbalized. Awaiting room assignment. Language line used for communication with pt and pt's spouse, interpretor #[id number].

## 2019-05-28 NOTE — OP NOTES
OPERATIVE NOTE    Date of Procedure: 5/28/2019   Preoperative Diagnosis: Cholecystitis, Dilated common bile duct  Postoperative Diagnosis: Same  Procedure(s):  CHOLECYSTECTOMY LAPAROSCOPIC  INTRAOPERATIVE CHOLANGIOGRAM  Surgeon(s) and Role:     Haydee Worrell MD - Primary         Surgical Staff:  Circ-1: Jocelyn Kate RN  Circ-2: Eduard Newby RN  Radiology Technician: RT Tyler  Scrub Tech-1: Zeeshan Stapler  Scrub Tech-Relief: Lesle Face  Surg Asst-1: Abouelenain, Mohsen  Event Time In Time Out   Incision Start 1232    Incision Close 1315      Anesthesia: General   Estimated Blood Loss: MIn  Specimens:   ID Type Source Tests Collected by Time Destination   1 : gallbladder Preservative Gallbladder  Ninfa Shahid MD 5/28/2019 1240 Pathology      Findings: Distended, acutely inflamed gallbladder with gallstones   Complications: none  Implants: * No implants in log *     INDICATION:  Clinical cholecystitis, dilated common bile duct. PROCEDURE: After standard pre-anesthetic and pre-operative procedure nursing protocols, general anesthesia instituted. Wth the patient supine on the operating table, the abdomen was cleaned, prepped, and draped in usual sterile fashion. The laparoscopic camera and CO2 insufflation apparatus were affixed to the drapes in the usual fashion. Pre-incision antibiotics were given 30 minutes prior to skin incision. Pre-incision liposomal bupivicaine and 0.5% plain marcaine anesthetic was injected in the usual port sites of the skin. Through a 5mm horizontal right para-umbilical skin incision, the abdomen was entered under direct camera vision with a 5 mm blunt tissue dissecting port. Insufflation was established satisfactorily and maintained at 15mm. The laparoscopic camera was re-introduced and confirmed no gross evidence of intraabdominal visceral injury or bleeding in attaining access.   Final midline horizontal port incisions were made, and under direct laparoscopic vision 5 mm and 12mm ventral ports were introduced. The patient was positioned in reverse Trendelenburg with left tilt. The fundus was grasped and lifted up towards the diaphragm. The infundibulum was retracted laterally and inferiorly. There was thickened peritoneum here and required meticulous dissection in order to completely free the infundibulum and cystic duct. The junction of the cystic duct and gallbladder were clearly identified, and an adequate length of the cystic duct was dissected out. Similarly, the cystic artery was also dissected out and an adequate length was displayed. The critical view of Calot's triangle was obtained and the structures were clearly identified. The cystic duct was clipped high as possible across the infudibular/cystic duct junction. Just under the clip, ductotomy was made. Thin, yellow bile came out. The cholangiogram catheter was introduced through the 12mm subcostal port. The catheter tip was introduced into the ductotomy and inflated. Saline flowed through the duct without intra-abdominal spilling in a clear intra-abdominal view. The sterile c-arm was positioned for cholangiogram.  Full contrast cholangiogram revealed no CBD defect with clear radiographic jet of contrast into the duodenum. Both main branches showed no filling defect. The cystic duct leading clearly into the common duct was visualized. At the time of surgery, I was not concerned for radiologic filling defects or obstruction. The c-arm was removed from the field. Cholangiogram catheter balloon was de-sufflated and entire system removed from the field. The proximal cystic duct was then clipped three times and divided cleanly across the prior ductotomy. The cystic artery was doubly clipped proximally and divided. With electrocautery, the gallbladder was gently dissected completely from the liver bed and placed in a retrieval bag. Hemostasis was satisfactory. The ports were removed under direct laparoscopic vision with no concern for preperitoneal or rectus arterial or venous bleeding. Remaining local anesthetic was injected into the pre-peritoneal plane, fascia and subcutaneous tissue under direct endoscopic vision. The gallbladder, instruments and ports were removed from the field and pneumoperitoneum released. The subxiphoid midline fascia was closed with an interrupted 0 vicryl suture. All skin incisions were closed with subcuticular 4-0 Monocryl, steristrips and tegaderm. The patient awoke in satisfactory condition. I went to discuss the findings with her  in the waiting area.     Cathy Chen MD

## 2019-05-28 NOTE — PROGRESS NOTES
[facility]: MED RECONCILIATION    Comments/Recommendations:     Anupam interpretor used for interview. The patient is taking no medications at this time. The patient is nursing a [de-identified] old child. Nurse notified and will updated the chart. Prior to Admission Medications:     Medication Documentation Review Audit       Reviewed by Lora Laurent PHARMD (Pharmacist) on 05/28/19 at [id number]      Medication Sig Documenting Provider Last Dose Status Taking?            No Medications to Display                               Thank you,      Aramis Waterman PharmD, BCPS

## 2019-05-28 NOTE — PROGRESS NOTES
I have reviewed discharge instructions with the patient and spouse. The patient and spouse verbalized understanding.     [employer] phone used for [place] interpretationJames Crawford [phone]

## 2019-05-28 NOTE — ED PROVIDER NOTES
33 y.o. female with past medical history significant for gall stones who presents to the ED with chief complaint of abdominal pain. Pt reports RUQ abdominal pain onset suddenly this morning at approximately 0030 accompanied by nausea and 3 episodes of vomiting. Pt states she has hx of similar sx, says she has been seen by a doctor in [place] for the same in the past and was dx with gall stones. Pt states she gets flare ups about every 2-3 months, says her flare ups \"come on strong and then go away. \" Pt denies hx of cholecystectomy. There are no other acute medical complaints voiced at this time. Social Hx: Never smoker. Denies EtOH use. PCP: None    Note written by Leona Briones, as dictated by Macrela Astorga MD 7:25 AM     The history is provided by the patient and the spouse. The history is limited by a language barrier (Chinese). A  was used (physician). Past Medical History:   Diagnosis Date    Gall stone        No past surgical history on file. No family history on file.     Social History     Socioeconomic History    Marital status: SINGLE     Spouse name: Not on file    Number of children: Not on file    Years of education: Not on file    Highest education level: Not on file   Occupational History    Not on file   Social Needs    Financial resource strain: Not on file    Food insecurity:     Worry: Not on file     Inability: Not on file    Transportation needs:     Medical: Not on file     Non-medical: Not on file   Tobacco Use    Smoking status: Never Smoker    Smokeless tobacco: Never Used   Substance and Sexual Activity    Alcohol use: No    Drug use: Not on file    Sexual activity: Not on file   Lifestyle    Physical activity:     Days per week: Not on file     Minutes per session: Not on file    Stress: Not on file   Relationships    Social connections:     Talks on phone: Not on file     Gets together: Not on file     Attends Taoist service: Not on file     Active member of club or organization: Not on file     Attends meetings of clubs or organizations: Not on file     Relationship status: Not on file    Intimate partner violence:     Fear of current or ex partner: Not on file     Emotionally abused: Not on file     Physically abused: Not on file     Forced sexual activity: Not on file   Other Topics Concern    Not on file   Social History Narrative    Not on file         ALLERGIES: Patient has no known allergies. Review of Systems   Constitutional: Negative for chills and fever. Respiratory: Negative for cough and shortness of breath. Cardiovascular: Negative for chest pain and leg swelling. Gastrointestinal: Positive for abdominal pain (RUQ), nausea and vomiting. Neurological: Negative for syncope and headaches. All other systems reviewed and are negative. Vitals:    05/28/19 0717 05/28/19 0745 05/28/19 0800 05/28/19 0815   BP: 116/74 117/66 100/64 131/75   Pulse: 75      Resp: 16      Temp: 98.8 °F (37.1 °C)      SpO2: 97% 98% 96% 98%   Weight: 71.7 kg (158 lb)      Height: 4' 11\" (1.499 m)               Physical Exam   Constitutional: She appears well-developed and well-nourished. No distress. HENT:   Head: Normocephalic and atraumatic. Eyes: Conjunctivae are normal.   Neck: Neck supple. Cardiovascular: Normal rate and regular rhythm. Pulmonary/Chest: Effort normal. No respiratory distress. Abdominal: She exhibits no distension. RUQ tenderness. Involuntary guarding. Positive sonographic Sharma's sign. Musculoskeletal: Normal range of motion. She exhibits no deformity. Neurological: She is alert. No cranial nerve deficit. Skin: Skin is warm and dry. Psychiatric: Her behavior is normal.   Nursing note and vitals reviewed.      Note written by Leona Baumann, as dictated by Esequiel Candelario MD 7:26 AM    MDM     33 y.o. female presents with recurrent right upper abdominal pain that started suddenly overnight accompanied by vomiting episodes. No signs of cholecystitis, no fever but has known stones which were identified at bedside and there is concern for CBD dilation. Formal US confirms suspicion with >12mm CBD new from comparison studies suggestive of choledocholithiasis. Pain well controlled but she remains persistently tender. Will consult GI for ERCP. GI deferring to general surgery for surgical management. Will admit for lap celeste and further management. Bedside US  Date/Time: 5/28/2019 7:27 AM  Performed by: Laz Zepeda MD  Authorized by: Laz Zepeda MD     Verbal consent obtained: Yes    Given by:  Patient  Performed by: Attending  Type of procedure: Focused biliary  Indications:  Abdominal pain  Gallbladder long axis:  Adequate  Gallbladder short axis:  Adequate  Gallstone(s):  Present  Sonographic Sharmas sign:  Present  Interpretation:  Cholelithiasis without sonographic evidence of cholecystitis  Left eye:     Confirmation study:  A confirmatory study was obtained while the patient was in the [facility]. Note written by Leona Robert, as dictated by Laz Zepeda MD 7:27 AM    CONSULT NOTE:  9:28 AM Laz Zepeda MD spoke with Dr. Demetrio Lindsey for Gastroenterology. Discussed available diagnostic tests and clinical findings. Dr. Molina Has recommends consulting General Surgery. CONSULT NOTE:  9:57 AM Laz Zepeda MD spoke with Dr. Dre Looney, Consult for General Surgery. Discussed available diagnostic tests and clinical findings. Dr. Dre Looney will see pt and take her to surgery.

## 2019-05-28 NOTE — ED NOTES
Education/pre-op preparation & checklist completed using [employer] phone for translation (Interpretor #[id number]). Plan of care discussed and questions answered.

## 2019-05-28 NOTE — DISCHARGE SUMMARY
Discharge Summary    Patient: Jenny Fernandez               Sex: female          DOA: 5/28/2019  7:19 AM       YOB: 1990      Age:  33 y.o.        LOS:  LOS: 0 days                Discharge Date:      Admission Diagnoses: No admission diagnoses are documented for this encounter. Discharge Diagnoses:  Same    Procedure:  Procedure(s):  [address] WITH GRAMS    Discharge Condition: Good    Hospital Course: Unremarkable operative procedure. Discharge to home in stable condition. Consults: None    Significant Diagnostic Studies: See full electronic record. Discharge Medications:     Current Discharge Medication List      START taking these medications    Details   oxyCODONE-acetaminophen (PERCOCET) 5-325 mg per tablet Take 1 Tab by mouth every four (4) hours as needed for Pain for up to 7 days. Max Daily Amount: 6 Tabs. Indications: Pain, Acute post-operative pain  Qty: 30 Tab, Refills: 0    Associated Diagnoses: Cholelithiasis with choledocholithiasis      ondansetron (ZOFRAN ODT) 4 mg disintegrating tablet Take 1 Tab by mouth every six (6) hours as needed for Nausea. Indications: Prevent Nausea and Vomiting After Surgery  Qty: 20 Tab, Refills: 1      polyethylene glycol (MIRALAX) 17 gram packet Take 1 Packet by mouth daily. Indications: constipation, If constipation last more than 24-48 hours, despite colace use. Qty: 238 g, Refills: 0           Activity/Diet/Wound Care: See patient administered discharge instructions.     Follow-up: 2 weeks    Dara Khan MD  [facility]  Office:  [phone]

## 2019-05-28 NOTE — H&P
Assessment:     Symptomatic Cholelithiasis  Dilated CBD      Plan:     Laparoscopic Cholecystectomy With Cholangiogram, possible open  NPO/IVF  Prophylactic antibiotics  Observation to [place] [facility]    Signed By: Hector Cortez MD  [facility]  Office:  [phone]    May 28, 2019          General Surgery History and Physical    Subjective:      Rosalva Costa is a 33 y.o.  female who presents with symptomatic cholelithiasis  5 years. She is s/p normal IUP with 3month old. Denies coke colored urine, acholic stools, excessive itching, jaundice. She came to ER with  because pain was worst ever 9/10 grabbing/stabbing, with nausea and vomiting. Nothing made it better. IV meds in ER improved symptoms. In ER, vtals unremarkable with mild elevation transaminasis. US reveals 12.5cm CBD, cholelithiaisis, no evidence of cholecystitis. Past Medical History:   Diagnosis Date    Gall stone      History reviewed. No pertinent surgical history. History reviewed. No pertinent family history.   Social History     Socioeconomic History    Marital status: SINGLE     Spouse name: Not on file    Number of children: Not on file    Years of education: Not on file    Highest education level: Not on file   Tobacco Use    Smoking status: Never Smoker    Smokeless tobacco: Never Used   Substance and Sexual Activity    Alcohol use: No      Current Facility-Administered Medications   Medication Dose Route Frequency    ondansetron (ZOFRAN) injection 4 mg  4 mg IntraVENous Q1H PRN    HYDROmorphone (PF) (DILAUDID) injection 1 mg  1 mg IntraVENous Q1H PRN    lidocaine (PF) (XYLOCAINE) 10 mg/mL (1 %) injection 0.1 mL  0.1 mL SubCUTAneous PRN    lactated Ringers infusion  125 mL/hr IntraVENous CONTINUOUS    lactated ringers bolus infusion 1,000 mL  1,000 mL IntraVENous ONCE    naloxone (NARCAN) injection 0.2 mg  0.2 mg IntraVENous EVERY 2 MINUTES AS NEEDED    flumazenil (ROMAZICON) 0.1 mg/mL injection 0.2 mg  0.2 mg IntraVENous Multiple    lactated Ringers infusion  125 mL/hr IntraVENous CONTINUOUS    ceFAZolin (ANCEF) 2 g/20 mL in sterile water IV syringe  2 g IntraVENous ONCE      No Known Allergies    Review of Systems:     []     Unable to obtain  ROS due to  []    mental status change  []    sedated   []    intubated   [x]    Total of 12 system negative, unless specified below or in HPI:  Constitutional: negative fever, negative chills, negative weight loss  Eyes:   negative visual changes  ENT:   negative sore throat, tongue or lip swelling  Respiratory:  negative cough, negative dyspnea  Cards:  negative for chest pain, palpitations, lower extremity edema  GI:   See HPI  :  negative for frequency, dysuria  Integument:  negative for rash and pruritus  Heme:  negative for easy bruising and gum/nose bleeding  Musculoskel: negative for myalgias,  back pain and muscle weakness  Neuro:  negative for headaches, dizziness, vertigo  Psych:  negative for feelings of anxiety, depression     Objective:        Patient Vitals for the past 8 hrs:   BP Temp Pulse Resp SpO2 Height Weight   19 1106 136/74 98.3 °F (36.8 °C) 82 16 100 % -- --   19 1030 120/77 -- -- -- 98 % -- --   19 0930 101/62 -- -- -- 95 % -- --   19 0915 110/63 -- -- -- 95 % -- --   19 0900 110/65 -- -- -- 96 % -- --   19 0845 115/67 -- -- -- 96 % -- --   19 0830 122/68 -- -- -- 97 % -- --   19 0815 131/75 -- -- -- 98 % -- --   19 0800 100/64 -- -- -- 96 % -- --   19 0745 117/66 -- -- -- 98 % -- --   19 0717 116/74 98.8 °F (37.1 °C) 75 16 97 % 4' 11\" (1.499 m) 71.7 kg (158 lb)       Temp (24hrs), Av.6 °F (37 °C), Min:98.3 °F (36.8 °C), Max:98.8 °F (37.1 °C)      Physical Exam:  General:  Alert, cooperative, no distress, appears stated age. Eyes:  Conjunctivae/corneas clear. PERRL, EOMs intact. Nose: Nares normal. Septum midline.  Mucosa normal. No drainage or sinus tenderness. Mouth/Throat: Lips, mucosa, and tongue normal. Teeth and gums normal.   Neck: Supple, symmetrical, trachea midline, no adenopathy, thyroid: no enlargment/tenderness/nodules, no carotid bruit and no JVD. Back:   Symmetric, no curvature. ROM normal. No CVA tenderness. Lungs:   Clear to auscultation bilaterally. Heart:  Regular rate and rhythm, S1, S2 normal, no murmur, click, rub or gallop. Abdomen:   Soft, epigastric tenderness. Bowel sounds normal. No masses,  No organomegaly. Extremities: Extremities normal, atraumatic, no cyanosis or edema. Pulses: 2+ and symmetric all extremities.    Skin: Skin color, texture, turgor normal. No rashes or lesions   Lymph nodes: Cervical, supraclavicular, and axillary nodes normal.     BMP:   Lab Results   Component Value Date/Time     05/28/2019 07:44 AM    K 3.8 05/28/2019 07:44 AM     (H) 05/28/2019 07:44 AM    CO2 21 05/28/2019 07:44 AM    AGAP 8 05/28/2019 07:44 AM     (H) 05/28/2019 07:44 AM    BUN 15 05/28/2019 07:44 AM    CREA 0.51 (L) 05/28/2019 07:44 AM    GFRAA >60 05/28/2019 07:44 AM    GFRNA >60 05/28/2019 07:44 AM     CMP:   Lab Results   Component Value Date/Time     05/28/2019 07:44 AM    K 3.8 05/28/2019 07:44 AM     (H) 05/28/2019 07:44 AM    CO2 21 05/28/2019 07:44 AM    AGAP 8 05/28/2019 07:44 AM     (H) 05/28/2019 07:44 AM    BUN 15 05/28/2019 07:44 AM    CREA 0.51 (L) 05/28/2019 07:44 AM    GFRAA >60 05/28/2019 07:44 AM    GFRNA >60 05/28/2019 07:44 AM    CA 8.6 05/28/2019 07:44 AM    ALB 4.0 05/28/2019 07:44 AM    TP 7.6 05/28/2019 07:44 AM    GLOB 3.6 05/28/2019 07:44 AM    AGRAT 1.1 05/28/2019 07:44 AM    SGOT 41 (H) 05/28/2019 07:44 AM    ALT 60 05/28/2019 07:44 AM     COAGS: No results found for: APTT, PTP, INR  Liver Panel:   Lab Results   Component Value Date/Time    ALB 4.0 05/28/2019 07:44 AM    TP 7.6 05/28/2019 07:44 AM    GLOB 3.6 05/28/2019 07:44 AM    AGRAT 1.1 05/28/2019 07:44 AM SGOT 41 (H) 05/28/2019 07:44 AM    ALT 60 05/28/2019 07:44 AM     05/28/2019 07:44 AM     Pancreatic Markers:   Lab Results   Component Value Date/Time    LPSE 101 05/28/2019 07:44 AM

## 2019-05-28 NOTE — BRIEF OP NOTE
BRIEF OPERATIVE NOTE    Date of Procedure: 5/28/2019   Preoperative Diagnosis: Cholecystitis, Dilated common bile duct  Postoperative Diagnosis: Same  Procedure(s):  CHOLECYSTECTOMY LAPAROSCOPIC  INTRAOPERATIVE CHOLANGIOGRAM  Surgeon(s) and Role:     Leonardo Silva MD - Primary         Surgical Staff:  Circ-1: Yumiko Avalos RN  Circ-2: Hector Mc RN  Radiology Technician: Alonzo Singh, RT  Scrub Tech-1: Chris Cotton  Scrub Tech-Relief: Dolph Nephew  Surg Asst-1: Abouelenain, Mohsen  Event Time In Time Out   Incision Start 1232    Incision Close 1315      Anesthesia: General   Estimated Blood Loss: MIn  Specimens:   ID Type Source Tests Collected by Time Destination   1 : gallbladder Preservative Gallbladder  Glenroy Emery MD 5/28/2019 1240 Pathology      Findings: Distended, acutely inflamed gallbladder with gallstones   Complications: none  Implants: * No implants in log *

## 2020-03-26 ENCOUNTER — HOSPITAL ENCOUNTER (OUTPATIENT)
Dept: GENERAL RADIOLOGY | Age: 30
Discharge: HOME | End: 2020-03-26
Attending: SURGERY
Payer: SELF-PAY

## 2020-03-26 DIAGNOSIS — K80.20 GALL STONES: ICD-10-CM

## 2020-03-26 PROCEDURE — 74300 X-RAY BILE DUCTS/PANCREAS: CPT

## (undated) DEVICE — COVER LT HNDL PLAS RIG 1 PER PK

## (undated) DEVICE — SUTURE MCRYL SZ 4-0 L27IN ABSRB UD L19MM PS-2 1/2 CIR PRIM Y426H

## (undated) DEVICE — DEVICE TRNSF SPIK STL 2008S] MICROTEK MEDICAL INC]

## (undated) DEVICE — TUBING INSUFLTN 10FT LUER -- CONVERT TO ITEM 368568

## (undated) DEVICE — BLADELESS OPTICAL TROCAR WITH FIXATION CANNULA: Brand: VERSAPORT

## (undated) DEVICE — SYR 10ML LUER LOK 1/5ML GRAD --

## (undated) DEVICE — STERILE POLYISOPRENE POWDER-FREE SURGICAL GLOVES: Brand: PROTEXIS

## (undated) DEVICE — INFECTION CONTROL KIT SYS

## (undated) DEVICE — UNIVERSAL FIXATION CANNULA: Brand: VERSAONE

## (undated) DEVICE — TOWEL SURG W17XL27IN STD BLU COT NONFENESTRATED PREWASHED

## (undated) DEVICE — Device

## (undated) DEVICE — NEEDLE HYPO 22GA L1.5IN BLK S STL HUB POLYPR SHLD REG BVL

## (undated) DEVICE — STRIP,CLOSURE,WOUND,MEDI-STRIP,1/2X4: Brand: MEDLINE

## (undated) DEVICE — REM POLYHESIVE ADULT PATIENT RETURN ELECTRODE: Brand: VALLEYLAB

## (undated) DEVICE — STRAP POS KNEE BODY VELC

## (undated) DEVICE — SPECIMEN RETRIEVAL POUCH: Brand: ENDO CATCH GOLD

## (undated) DEVICE — CLICKLINE SCISSORS INSERT: Brand: CLICKLINE

## (undated) DEVICE — SURGICAL PROCEDURE KIT GEN LAPAROSCOPY LF

## (undated) DEVICE — (D)PREP SKN CHLRAPRP APPL 26ML -- CONVERT TO ITEM 371833

## (undated) DEVICE — SET CHOLANGIOGRAPHY 4FR L60CM W/ ARW KARLAN BLLN CATH CRV

## (undated) DEVICE — DRAPE,REIN 53X77,STERILE: Brand: MEDLINE

## (undated) DEVICE — BLADELESS OPTICAL TROCAR WITH FIXATION CANNULA: Brand: VERSAONE

## (undated) DEVICE — 3M™ TEGADERM™ TRANSPARENT FILM DRESSING FRAME STYLE, 1624W, 2-3/8 IN X 2-3/4 IN (6 CM X 7 CM), 100/CT 4CT/CASE: Brand: 3M™ TEGADERM™

## (undated) DEVICE — CLIP APPLIER: Brand: ENDO CLIP II

## (undated) DEVICE — PAD,NON-ADHERENT,3X8,STERILE,LF,1/PK: Brand: MEDLINE

## (undated) DEVICE — SUTURE SZ 0 27IN 5/8 CIR UR-6  TAPER PT VIOLET ABSRB VICRYL J603H